# Patient Record
Sex: FEMALE | Race: WHITE | NOT HISPANIC OR LATINO | Employment: FULL TIME | URBAN - METROPOLITAN AREA
[De-identification: names, ages, dates, MRNs, and addresses within clinical notes are randomized per-mention and may not be internally consistent; named-entity substitution may affect disease eponyms.]

---

## 2023-02-28 LAB — ALKALINE PHOSPHATASE (U/L) IN SER/PLAS: 384 U/L (ref 33–136)

## 2023-03-06 ENCOUNTER — LAB (OUTPATIENT)
Dept: LAB | Facility: LAB | Age: 63
End: 2023-03-06
Payer: COMMERCIAL

## 2023-03-06 DIAGNOSIS — M89.9 LYTIC BONE LESIONS ON XRAY: Primary | ICD-10-CM

## 2023-03-06 DIAGNOSIS — M89.9 LYTIC BONE LESIONS ON XRAY: ICD-10-CM

## 2023-03-06 PROCEDURE — 84165 PROTEIN E-PHORESIS SERUM: CPT

## 2023-03-06 PROCEDURE — 84165 PROTEIN E-PHORESIS SERUM: CPT | Performed by: STUDENT IN AN ORGANIZED HEALTH CARE EDUCATION/TRAINING PROGRAM

## 2023-03-06 PROCEDURE — 36415 COLL VENOUS BLD VENIPUNCTURE: CPT

## 2023-03-06 RX ORDER — HYDROCODONE BITARTRATE AND ACETAMINOPHEN 5; 325 MG/1; MG/1
1 TABLET ORAL EVERY 6 HOURS PRN
Qty: 28 TABLET | Refills: 0 | Status: SHIPPED | OUTPATIENT
Start: 2023-03-06 | End: 2023-03-13

## 2023-03-06 RX ORDER — ALPRAZOLAM 1 MG/1
1 TABLET ORAL
Qty: 30 TABLET | Refills: 0 | Status: SHIPPED | OUTPATIENT
Start: 2023-03-06

## 2023-03-06 NOTE — PROGRESS NOTES
Called pt; she was in ER at [facility] due to worsening back pain.  She had positive d-dimer.  She had CTA that showed multiple lytic lesions of the thoracic spine.  She was given an appt with oncology for 3/17 that she would like sooner.  She was told to follow up with PCP.  She is asking to be admitted.  Explained we can do the work up as outpatient.  She is concerned about pain control.  She was given Norco but only for 3 days and she is almost out.  She will need refill on this as it is helping with her pain.  She also needs stat MRI of thoracic spine which we will order. She will need sedation for this so we did give her Xanax to take before imaging.  She will stop up to pick these up and will do additional lab/urine that we ordered.  We put in referral for oncology and will work to see if we can move up her appointment.

## 2023-03-07 ENCOUNTER — TELEPHONE (OUTPATIENT)
Dept: PRIMARY CARE | Facility: CLINIC | Age: 63
End: 2023-03-07
Payer: COMMERCIAL

## 2023-03-07 NOTE — TELEPHONE ENCOUNTER
[facility] called they denied her Mri thoracic spine due to imaging six weeks provider treatment .   Peer to peer number  [phone] CASE # [id number]       And they will fax info as well

## 2023-03-08 NOTE — TELEPHONE ENCOUNTER
Spoke with [facility] I could not fax Ct scan results, Insurance company stated Dr. Dominguez has to do a peer to peer, so I scheduled that for 03/08/23 at 12:00pm.

## 2023-03-10 LAB
ALBUMIN ELP: 3.7 G/DL (ref 3.4–5)
ALPHA 1: 0.4 G/DL (ref 0.2–0.6)
ALPHA 2: 1 G/DL (ref 0.4–1.1)
BETA: 1 G/DL (ref 0.5–1.2)
GAMMA GLOBULIN: 1.2 G/DL (ref 0.5–1.4)
PATH REVIEW-SERUM PROTEIN ELECTROPHORESIS: NORMAL
PROTEIN ELECTROPHORESIS INTERPRETATION: NORMAL
PROTEIN TOTAL: 7.3 G/DL (ref 6.4–8.2)

## 2023-03-13 DIAGNOSIS — E03.9 ACQUIRED HYPOTHYROIDISM: Primary | ICD-10-CM

## 2023-03-13 PROBLEM — M20.21 ACQUIRED HALLUX RIGIDUS OF RIGHT FOOT: Status: ACTIVE | Noted: 2023-03-13

## 2023-03-13 PROBLEM — G57.62 MORTON'S NEUROMA OF LEFT FOOT: Status: ACTIVE | Noted: 2023-03-13

## 2023-03-13 PROBLEM — M72.2 PLANTAR FIBROMATOSIS: Status: ACTIVE | Noted: 2023-03-13

## 2023-03-13 PROBLEM — I34.1 MVP (MITRAL VALVE PROLAPSE): Status: ACTIVE | Noted: 2023-03-13

## 2023-03-13 PROBLEM — H35.30 MACULAR DEGENERATION: Status: ACTIVE | Noted: 2023-03-13

## 2023-03-13 PROBLEM — M19.071 PRIMARY OSTEOARTHRITIS OF BOTH FEET: Status: ACTIVE | Noted: 2023-03-13

## 2023-03-13 PROBLEM — I34.0 MITRAL REGURGITATION: Status: ACTIVE | Noted: 2023-03-13

## 2023-03-13 PROBLEM — Z86.19 HISTORY OF CHICKEN POX: Status: ACTIVE | Noted: 2023-03-13

## 2023-03-13 PROBLEM — H83.8X2 SUPERIOR SEMICIRCULAR CANAL DEHISCENCE OF LEFT EAR: Status: ACTIVE | Noted: 2023-03-13

## 2023-03-13 PROBLEM — M20.22 ACQUIRED HALLUX RIGIDUS OF LEFT FOOT: Status: ACTIVE | Noted: 2023-03-13

## 2023-03-13 PROBLEM — L60.3 DYSTROPHIC NAIL: Status: ACTIVE | Noted: 2023-03-13

## 2023-03-13 PROBLEM — M19.072 PRIMARY OSTEOARTHRITIS OF BOTH FEET: Status: ACTIVE | Noted: 2023-03-13

## 2023-03-13 PROBLEM — M21.619 ASYMPTOMATIC BUNION: Status: ACTIVE | Noted: 2023-03-13

## 2023-03-13 RX ORDER — LEVOTHYROXINE SODIUM 75 UG/1
TABLET ORAL
Qty: 90 TABLET | Refills: 3 | Status: SHIPPED | OUTPATIENT
Start: 2023-03-13

## 2023-04-16 ENCOUNTER — NURSING HOME VISIT (OUTPATIENT)
Dept: POST ACUTE CARE | Facility: EXTERNAL LOCATION | Age: 63
End: 2023-04-16
Payer: COMMERCIAL

## 2023-04-16 DIAGNOSIS — J18.9 PNEUMONIA OF BOTH LOWER LOBES DUE TO INFECTIOUS ORGANISM: ICD-10-CM

## 2023-04-16 DIAGNOSIS — R09.02 HYPOXIA: ICD-10-CM

## 2023-04-16 DIAGNOSIS — D64.9 ANEMIA, UNSPECIFIED TYPE: ICD-10-CM

## 2023-04-16 DIAGNOSIS — E03.9 ACQUIRED HYPOTHYROIDISM: ICD-10-CM

## 2023-04-16 DIAGNOSIS — S22.000D COMPRESSION FRACTURE OF THORACIC VERTEBRA WITH ROUTINE HEALING, UNSPECIFIED THORACIC VERTEBRAL LEVEL, SUBSEQUENT ENCOUNTER: ICD-10-CM

## 2023-04-16 DIAGNOSIS — M84.50XA PATHOLOGICAL FRACTURE DUE TO METASTATIC BONE DISEASE: ICD-10-CM

## 2023-04-16 DIAGNOSIS — R53.83 OTHER FATIGUE: Primary | ICD-10-CM

## 2023-04-16 PROCEDURE — 99306 1ST NF CARE HIGH MDM 50: CPT | Performed by: INTERNAL MEDICINE

## 2023-04-16 NOTE — PROGRESS NOTES
Patient H &P was done on paper and put in patients chart at SNF  1. Other fatigue    2. Hypoxia    3. Pneumonia of both lower lobes due to infectious organism    4. 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 Compression fracture of thoracic vertebra with routine healing, unspecified thoracic vertebral level, subsequent encounter    6. Anemia, unspecified type    7. Acquired hypothyroidism

## 2023-04-16 NOTE — LETTER
Patient: So Schwartz  : 1960    Encounter Date: 2023    Patient H &P was done on paper and put in patients chart at SNF  1. Other fatigue    2. Hypoxia    3. Pneumonia of both lower lobes due to infectious organism    4. 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 Compression fracture of thoracic vertebra with routine healing, unspecified thoracic vertebral level, subsequent encounter    6. Anemia, unspecified type    7. Acquired hypothyroidism          Electronically Signed By: Corey Burton MD   23  6:39 PM

## 2023-04-24 ENCOUNTER — NURSING HOME VISIT (OUTPATIENT)
Dept: POST ACUTE CARE | Facility: EXTERNAL LOCATION | Age: 63
End: 2023-04-24
Payer: COMMERCIAL

## 2023-04-24 DIAGNOSIS — M54.6 THORACIC BACK PAIN, UNSPECIFIED BACK PAIN LATERALITY, UNSPECIFIED CHRONICITY: ICD-10-CM

## 2023-04-24 DIAGNOSIS — S22.000D COMPRESSION FRACTURE OF THORACIC VERTEBRA WITH ROUTINE HEALING, UNSPECIFIED THORACIC VERTEBRAL LEVEL, SUBSEQUENT ENCOUNTER: ICD-10-CM

## 2023-04-24 DIAGNOSIS — M84.50XA PATHOLOGICAL FRACTURE DUE TO METASTATIC BONE DISEASE: Primary | ICD-10-CM

## 2023-04-24 DIAGNOSIS — R53.81 PHYSICAL DECONDITIONING: ICD-10-CM

## 2023-04-24 PROCEDURE — 99309 SBSQ NF CARE MODERATE MDM 30: CPT | Performed by: INTERNAL MEDICINE

## 2023-04-24 NOTE — LETTER
Patient: So Schwartz  : 1960    Encounter Date: 2023        Nursing home visit  Name: So Schwartz  MRN: [id number]  YOB: 1960  Date of Service: 2023      Pt was evaluated during nursing home visit today  Patient is doing OK. Participating in therapy well  No sob, cp, PND, orthopnea  Eating ok, sleeping is not that great.  Moving BM ok.   Tolerating medications well  Pain is under control  Going for Bone biopsy tomorrow to find out about her diagnosis   Objective :  BP: 128/72 mmHg  2023 22:41  Temp:97.9 °F  2023 22:41  Pulse:76 bpm  2023 22:41  Weight:115.8 Lbs  2023 16:01  Resp:16 Breaths/min  2023 22:41  BS:  O2:96 %  2023 22:41  Pain:0  2023 22:36    Vitals were noted  Patient is not any acute distress  Conjunctiva- Pink, no icterus   No cervical LN enlargement   Lungs clear, s1s2 +   No edema , No calf tenderness     Assessment:    1. 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 Physical deconditioning    3. Compression fracture of thoracic vertebra with routine healing, unspecified thoracic vertebral level, subsequent encounter    4. Thoracic back pain, unspecified back pain laterality, unspecified chronicity         Plan:  Patient is doing well.   Continue OT/PT Rehab.   Current medications are effective. advised to continue current medications.  Will continue to follow   Patient felt satisfied with plan            Electronically Signed By: Corey Burton MD   23  9:47 AM

## 2023-04-24 NOTE — PROGRESS NOTES
Nursing home visit  Name: So Schwartz  MRN: [id number]  YOB: 1960  Date of Service: 4/24/2023      Pt was evaluated during nursing home visit today  Patient is doing OK. Participating in therapy well  No sob, cp, PND, orthopnea  Eating ok, sleeping is not that great.  Moving BM ok.   Tolerating medications well  Pain is under control  Going for Bone biopsy tomorrow to find out about her diagnosis   Objective :  BP: 128/72 mmHg  4/23/2023 22:41  Temp:97.9 °F  4/23/2023 22:41  Pulse:76 bpm  4/23/2023 22:41  Weight:115.8 Lbs  4/20/2023 16:01  Resp:16 Breaths/min  4/23/2023 22:41  BS:  O2:96 %  4/23/2023 22:41  Pain:0  4/23/2023 22:36    Vitals were noted  Patient is not any acute distress  Conjunctiva- Pink, no icterus   No cervical LN enlargement   Lungs clear, s1s2 +   No edema , No calf tenderness     Assessment:    1. 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 Physical deconditioning    3. Compression fracture of thoracic vertebra with routine healing, unspecified thoracic vertebral level, subsequent encounter    4. Thoracic back pain, unspecified back pain laterality, unspecified chronicity         Plan:  Patient is doing well.   Continue OT/PT Rehab.   Current medications are effective. advised to continue current medications.  Will continue to follow   Patient felt satisfied with plan

## 2023-04-28 ENCOUNTER — NURSING HOME VISIT (OUTPATIENT)
Dept: POST ACUTE CARE | Facility: EXTERNAL LOCATION | Age: 63
End: 2023-04-28
Payer: COMMERCIAL

## 2023-04-28 DIAGNOSIS — C80.1 ADENOCARCINOMA OF UNKNOWN ORIGIN (MULTI): ICD-10-CM

## 2023-04-28 DIAGNOSIS — S22.000D COMPRESSION FRACTURE OF THORACIC VERTEBRA WITH ROUTINE HEALING, UNSPECIFIED THORACIC VERTEBRAL LEVEL, SUBSEQUENT ENCOUNTER: Primary | ICD-10-CM

## 2023-04-28 DIAGNOSIS — I48.0 PAROXYSMAL ATRIAL FIBRILLATION (MULTI): ICD-10-CM

## 2023-04-28 DIAGNOSIS — S22.000D COMPRESSION FRACTURE OF THORACIC VERTEBRA WITH ROUTINE HEALING, UNSPECIFIED THORACIC VERTEBRAL LEVEL, SUBSEQUENT ENCOUNTER: ICD-10-CM

## 2023-04-28 DIAGNOSIS — M54.6 THORACIC BACK PAIN, UNSPECIFIED BACK PAIN LATERALITY, UNSPECIFIED CHRONICITY: ICD-10-CM

## 2023-04-28 DIAGNOSIS — M84.50XA PATHOLOGICAL FRACTURE DUE TO METASTATIC BONE DISEASE: Primary | ICD-10-CM

## 2023-04-28 DIAGNOSIS — R53.81 PHYSICAL DECONDITIONING: ICD-10-CM

## 2023-04-28 DIAGNOSIS — Z65.9 CONCERNED ABOUT HAVING SOCIAL PROBLEM: ICD-10-CM

## 2023-04-28 PROCEDURE — 99309 SBSQ NF CARE MODERATE MDM 30: CPT | Performed by: INTERNAL MEDICINE

## 2023-04-28 NOTE — PROGRESS NOTES
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 Surgery is not option for her as per her.   Somewhat worried abut going home as lives by herself. Exploring options about help at home with   Patient is doing OK. Participating in therapy well  No sob, cp, PND, orthopnea  Eating ok, sleeping ok   Moving BM ok.   Tolerating medications well    Objective :  BP: 138/74 mmHg  4/28/2023 06:43  Temp:97.8 °F  4/28/2023 06:43  Pulse:85 bpm  4/28/2023 06:43  Weight:115.6 Lbs  4/25/2023 14:13  Resp:18 Breaths/min  4/28/2023 06:43  BS:  O2:94 %  4/28/2023 06:43  Pain:0  4/28/2023 06:43  Vitals were noted  Patient is not any acute distress  Conjunctiva- Pink, no icterus   No cervical LN enlargement   Lungs clear, s1s2 +   No edema , No calf tenderness     Assessment:    1. 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 Paroxysmal atrial fibrillation (CMS/HCC)    3. Compression fracture of thoracic vertebra with routine healing, unspecified thoracic vertebral level, subsequent encounter    4. Physical deconditioning    5. Concerned about having social problem    6. Adenocarcinoma of unknown origin (CMS/HCC)         Plan:  Patient is doing well.   Continue OT/PT Rehab.   Current medications are effective. advised to continue current medications.  Will continue to follow   Patient felt satisfied with plan

## 2023-04-28 NOTE — LETTER
Patient: So Schwartz  : 1960    Encounter Date: 2023        Nursing home visit  Name: So Schwartz  MRN: [id number]  YOB: 1960  Date of Service: 2023      Pt was evaluated during nursing home visit today  She went to see [facility] , was told that repeat bone biopsy showed stage 4 Adenocarcinoma, they will have to do further testing to find out more about it and to decide on treatment plan. Surgery is not option for her as per her.   Somewhat worried abut going home as lives by herself. Exploring options about help at home with   Patient is doing OK. Participating in therapy well  No sob, cp, PND, orthopnea  Eating ok, sleeping ok   Moving BM ok.   Tolerating medications well    Objective :  BP: 138/74 mmHg  2023 06:43  Temp:97.8 °F  2023 06:43  Pulse:85 bpm  2023 06:43  Weight:115.6 Lbs  2023 14:13  Resp:18 Breaths/min  2023 06:43  BS:  O2:94 %  2023 06:43  Pain:0  2023 06:43  Vitals were noted  Patient is not any acute distress  Conjunctiva- Pink, no icterus   No cervical LN enlargement   Lungs clear, s1s2 +   No edema , No calf tenderness     Assessment:    1. 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 Paroxysmal atrial fibrillation (CMS/HCC)    3. Compression fracture of thoracic vertebra with routine healing, unspecified thoracic vertebral level, subsequent encounter    4. Physical deconditioning    5. Concerned about having social problem    6. Adenocarcinoma of unknown origin (CMS/HCC)         Plan:  Patient is doing well.   Continue OT/PT Rehab.   Current medications are effective. advised to continue current medications.  Will continue to follow   Patient felt satisfied with plan            Electronically Signed By: Corey Burton MD   23 11:10 AM

## 2023-05-01 ENCOUNTER — NURSING HOME VISIT (OUTPATIENT)
Dept: POST ACUTE CARE | Facility: EXTERNAL LOCATION | Age: 63
End: 2023-05-01
Payer: COMMERCIAL

## 2023-05-01 DIAGNOSIS — S22.000D COMPRESSION FRACTURE OF THORACIC VERTEBRA WITH ROUTINE HEALING, UNSPECIFIED THORACIC VERTEBRAL LEVEL, SUBSEQUENT ENCOUNTER: ICD-10-CM

## 2023-05-01 DIAGNOSIS — R53.81 PHYSICAL DECONDITIONING: ICD-10-CM

## 2023-05-01 DIAGNOSIS — C80.1 ADENOCARCINOMA OF UNKNOWN ORIGIN (MULTI): Primary | ICD-10-CM

## 2023-05-01 DIAGNOSIS — Z65.9 CONCERNED ABOUT HAVING SOCIAL PROBLEM: ICD-10-CM

## 2023-05-01 PROCEDURE — 99308 SBSQ NF CARE LOW MDM 20: CPT | Performed by: INTERNAL MEDICINE

## 2023-05-01 NOTE — LETTER
Patient: So Schwartz  : 1960    Encounter Date: 2023        Nursing home follow up visit  Name: So Schwartz  MRN: [id number]  YOB: 1960  Date of Service: 2023      Pt was evaluated during nursing home visit today  Patient is doing OK. Participating in therapy well  No sob, cp, PND, orthopnea  Eating ok, sleeping ok   Moving BM ok.   Tolerating medications well  Worried about home going issue. As per her insurance has cut her in a day so she will talk to  , may stay on private pay    Objective :  Vitals were noted, thin   BP: 120/74 mmHg  2023 00:36    Temp:97.6 °F  2023 00:36  Pulse:81 bpm  2023 00:36  Weight:115.6 Lbs  2023 14:13  Resp:18 Breaths/min  2023 00:36  BS:  O2:95 %  2023 00:36  Pain:0  2023 23:42  Patient is not any acute distress  Conjunctiva- Pink, no icterus   No cervical LN enlargement   Lungs clear, s1s2 +   No edema , No calf tenderness     Assessment:    1. Adenocarcinoma of unknown origin (CMS/HCC)    2. Compression fracture of thoracic vertebra with routine healing, unspecified thoracic vertebral level, subsequent encounter    3. Physical deconditioning    4. Concerned about having social problem          Plan:  Patient is doing well.   She is awaiting further instruction from her oncologist about her treatment plan  Continue OT/PT Rehab.   Current medications are effective. advised to continue current medications.  Will continue to follow   Patient felt satisfied with plan            Electronically Signed By: Corey Burton MD   23 11:46 AM

## 2023-05-01 NOTE — PROGRESS NOTES
Nursing home follow up visit  Name: So Schwartz  MRN: [id number]  YOB: 1960  Date of Service: 5/1/2023      Pt was evaluated during nursing home visit today  Patient is doing OK. Participating in therapy well  No sob, cp, PND, orthopnea  Eating ok, sleeping ok   Moving BM ok.   Tolerating medications well  Worried about home going issue. As per her insurance has cut her in a day so she will talk to  , may stay on private pay    Objective :  Vitals were noted, thin   BP: 120/74 mmHg  4/30/2023 00:36    Temp:97.6 °F  4/30/2023 00:36  Pulse:81 bpm  4/30/2023 00:36  Weight:115.6 Lbs  4/25/2023 14:13  Resp:18 Breaths/min  4/30/2023 00:36  BS:  O2:95 %  4/30/2023 00:36  Pain:0  4/30/2023 23:42  Patient is not any acute distress  Conjunctiva- Pink, no icterus   No cervical LN enlargement   Lungs clear, s1s2 +   No edema , No calf tenderness     Assessment:    1. Adenocarcinoma of unknown origin (CMS/HCC)    2. Compression fracture of thoracic vertebra with routine healing, unspecified thoracic vertebral level, subsequent encounter    3. Physical deconditioning    4. Concerned about having social problem          Plan:  Patient is doing well.   She is awaiting further instruction from her oncologist about her treatment plan  Continue OT/PT Rehab.   Current medications are effective. advised to continue current medications.  Will continue to follow   Patient felt satisfied with plan

## 2023-05-02 ENCOUNTER — TELEPHONE (OUTPATIENT)
Dept: PRIMARY CARE | Facility: CLINIC | Age: 63
End: 2023-05-02
Payer: COMMERCIAL

## 2023-05-02 NOTE — TELEPHONE ENCOUNTER
Visiting nurses called today asking if you can ok Home health, DC at skilled nursing for fracture metastatic bone disease.       Ana

## 2023-05-04 ENCOUNTER — TELEPHONE (OUTPATIENT)
Dept: PRIMARY CARE | Facility: CLINIC | Age: 63
End: 2023-05-04
Payer: COMMERCIAL

## 2023-05-04 NOTE — TELEPHONE ENCOUNTER
I need all her info first as I have not seen her in some time; I have been watching oncologist notes but she last saw them end of March; I am not up to speed with what they found with her bone lesions  Need all records to review first

## 2023-05-04 NOTE — TELEPHONE ENCOUNTER
I reviewed her notes-she has established with [facility] and is also referred to palliative through them; not sure if she is still seeing me so will not agree until we know who her PCP will be at this point; may need to be palliative care if she has established

## 2023-05-12 NOTE — TELEPHONE ENCOUNTER
Patient called called today released from [facility]  and pavel [facility] for rehab, was told needs follow up with you, however she states she can not make it in to the office, can you do a phone??  She was diagnosed with Stage 4 Bone Marrow cancer.  She does have [facility] oncologist. Will discuss pallative care with them.       Call after 12pm-- Grace

## 2023-05-12 NOTE — TELEPHONE ENCOUNTER
Let pt know I have been following from afar her care and am terribly sorry to hear the diagnosis  If she wishes to just see oncology and palliative care that is ok  She does not need to see me at this point unless she wishes to do so  If so I can do a telephone visit

## 2023-05-12 NOTE — TELEPHONE ENCOUNTER
Let pt know I have been following from afar her care and am terribly sorry to hear the diagnosis  If she wishes to just see oncology and palliative care that is ok  She does not need to see me at this point unless she wishes to do so  If so I can do a telephone visit        Patient advised and she appreciates your kindness and I will cancel her upcoming visit.